# Patient Record
Sex: FEMALE | Race: WHITE | NOT HISPANIC OR LATINO | URBAN - METROPOLITAN AREA
[De-identification: names, ages, dates, MRNs, and addresses within clinical notes are randomized per-mention and may not be internally consistent; named-entity substitution may affect disease eponyms.]

---

## 2018-10-11 ENCOUNTER — INPATIENT (INPATIENT)
Facility: HOSPITAL | Age: 28
LOS: 5 days | Discharge: HOME | DRG: 880 | End: 2018-10-17
Attending: PSYCHIATRY & NEUROLOGY | Admitting: PSYCHIATRY & NEUROLOGY
Payer: COMMERCIAL

## 2018-10-11 VITALS
WEIGHT: 139.99 LBS | SYSTOLIC BLOOD PRESSURE: 146 MMHG | HEART RATE: 97 BPM | DIASTOLIC BLOOD PRESSURE: 72 MMHG | RESPIRATION RATE: 18 BRPM | TEMPERATURE: 98 F | OXYGEN SATURATION: 95 %

## 2018-10-11 DIAGNOSIS — F33.2 MAJOR DEPRESSIVE DISORDER, RECURRENT SEVERE WITHOUT PSYCHOTIC FEATURES: ICD-10-CM

## 2018-10-11 LAB
ANION GAP SERPL CALC-SCNC: 14 MMOL/L — SIGNIFICANT CHANGE UP (ref 5–17)
APAP SERPL-MCNC: <5 UG/ML — LOW (ref 10–30)
APPEARANCE UR: ABNORMAL
BASOPHILS NFR BLD AUTO: 0.4 % — SIGNIFICANT CHANGE UP (ref 0–2)
BILIRUB UR-MCNC: NEGATIVE — SIGNIFICANT CHANGE UP
BUN SERPL-MCNC: 12 MG/DL — SIGNIFICANT CHANGE UP (ref 7–23)
CALCIUM SERPL-MCNC: 9.9 MG/DL — SIGNIFICANT CHANGE UP (ref 8.4–10.5)
CHLORIDE SERPL-SCNC: 100 MMOL/L — SIGNIFICANT CHANGE UP (ref 96–108)
CO2 SERPL-SCNC: 27 MMOL/L — SIGNIFICANT CHANGE UP (ref 22–31)
COLOR SPEC: YELLOW — SIGNIFICANT CHANGE UP
CREAT SERPL-MCNC: 0.8 MG/DL — SIGNIFICANT CHANGE UP (ref 0.5–1.3)
DIFF PNL FLD: NEGATIVE — SIGNIFICANT CHANGE UP
EOSINOPHIL NFR BLD AUTO: 0.2 % — SIGNIFICANT CHANGE UP (ref 0–6)
ETHANOL SERPL-MCNC: <10 MG/DL — SIGNIFICANT CHANGE UP (ref 0–10)
GLUCOSE SERPL-MCNC: 111 MG/DL — HIGH (ref 70–99)
GLUCOSE UR QL: NEGATIVE — SIGNIFICANT CHANGE UP
HCG SERPL-ACNC: <.1 MIU/ML — SIGNIFICANT CHANGE UP
HCT VFR BLD CALC: 38.6 % — SIGNIFICANT CHANGE UP (ref 34.5–45)
HGB BLD-MCNC: 12.8 G/DL — SIGNIFICANT CHANGE UP (ref 11.5–15.5)
KETONES UR-MCNC: ABNORMAL MG/DL
LEUKOCYTE ESTERASE UR-ACNC: NEGATIVE — SIGNIFICANT CHANGE UP
LYMPHOCYTES # BLD AUTO: 24.8 % — SIGNIFICANT CHANGE UP (ref 13–44)
MCHC RBC-ENTMCNC: 29.1 PG — SIGNIFICANT CHANGE UP (ref 27–34)
MCHC RBC-ENTMCNC: 33.2 G/DL — SIGNIFICANT CHANGE UP (ref 32–36)
MCV RBC AUTO: 87.7 FL — SIGNIFICANT CHANGE UP (ref 80–100)
MONOCYTES NFR BLD AUTO: 5.1 % — SIGNIFICANT CHANGE UP (ref 2–14)
NEUTROPHILS NFR BLD AUTO: 69.5 % — SIGNIFICANT CHANGE UP (ref 43–77)
NITRITE UR-MCNC: NEGATIVE — SIGNIFICANT CHANGE UP
PCP SPEC-MCNC: SIGNIFICANT CHANGE UP
PH UR: 6 — SIGNIFICANT CHANGE UP (ref 5–8)
PLATELET # BLD AUTO: 289 K/UL — SIGNIFICANT CHANGE UP (ref 150–400)
POTASSIUM SERPL-MCNC: 3.8 MMOL/L — SIGNIFICANT CHANGE UP (ref 3.5–5.3)
POTASSIUM SERPL-SCNC: 3.8 MMOL/L — SIGNIFICANT CHANGE UP (ref 3.5–5.3)
PROT UR-MCNC: NEGATIVE MG/DL — SIGNIFICANT CHANGE UP
RBC # BLD: 4.4 M/UL — SIGNIFICANT CHANGE UP (ref 3.8–5.2)
RBC # FLD: 13 % — SIGNIFICANT CHANGE UP (ref 10.3–16.9)
SALICYLATES SERPL-MCNC: <0.3 MG/DL — LOW (ref 2.8–20)
SODIUM SERPL-SCNC: 141 MMOL/L — SIGNIFICANT CHANGE UP (ref 135–145)
SP GR SPEC: 1.02 — SIGNIFICANT CHANGE UP (ref 1–1.03)
UROBILINOGEN FLD QL: 0.2 E.U./DL — SIGNIFICANT CHANGE UP
WBC # BLD: 9.4 K/UL — SIGNIFICANT CHANGE UP (ref 3.8–10.5)
WBC # FLD AUTO: 9.4 K/UL — SIGNIFICANT CHANGE UP (ref 3.8–10.5)

## 2018-10-11 PROCEDURE — 99285 EMERGENCY DEPT VISIT HI MDM: CPT | Mod: 25

## 2018-10-11 PROCEDURE — 93010 ELECTROCARDIOGRAM REPORT: CPT | Mod: NC

## 2018-10-11 RX ORDER — TRAZODONE HCL 50 MG
50 TABLET ORAL AT BEDTIME
Qty: 0 | Refills: 0 | Status: DISCONTINUED | OUTPATIENT
Start: 2018-10-11 | End: 2018-10-17

## 2018-10-11 RX ORDER — ACETAMINOPHEN 500 MG
650 TABLET ORAL EVERY 6 HOURS
Qty: 0 | Refills: 0 | Status: DISCONTINUED | OUTPATIENT
Start: 2018-10-11 | End: 2018-10-17

## 2018-10-11 RX ORDER — MAGNESIUM HYDROXIDE 400 MG/1
30 TABLET, CHEWABLE ORAL DAILY
Qty: 0 | Refills: 0 | Status: DISCONTINUED | OUTPATIENT
Start: 2018-10-11 | End: 2018-10-17

## 2018-10-11 RX ADMIN — Medication 50 MILLIGRAM(S): at 22:35

## 2018-10-11 RX ADMIN — Medication 1 MILLIGRAM(S): at 22:35

## 2018-10-11 NOTE — ED BEHAVIORAL HEALTH ASSESSMENT NOTE - RISK ASSESSMENT
Imminent: Increased by passive SI, past SA, mood sx, etoh use, family hx. Decreased by help seeking, engagement in outpatient services and past response to treatment  Chronic: Increased by above as well as long-standing mood sx

## 2018-10-11 NOTE — ED BEHAVIORAL HEALTH ASSESSMENT NOTE - PSYCHIATRIC ISSUES AND PLAN (INCLUDE STANDING AND PRN MEDICATION)
lorazepam 1 mg po q8h prn anxiety, trazodone prn sleep, primary team to determine standing medications

## 2018-10-11 NOTE — ED BEHAVIORAL HEALTH ASSESSMENT NOTE - CURRENT MEDICATION
Alprazolam (has only taken 7-8 times over the past 2-3 months and halves a 0.5 mg table - take 0.25)  sertraline 50 mg po daily

## 2018-10-11 NOTE — ED BEHAVIORAL HEALTH ASSESSMENT NOTE - OTHER
Therapist Mar Barnes - [phone] two roommates unstable housing (looking for new apartment with roommates and having difficulties), work stressors

## 2018-10-11 NOTE — ED BEHAVIORAL HEALTH ASSESSMENT NOTE - DESCRIPTION (FIRST USE, LAST USE, QUANTITY, FREQUENCY, DURATION)
remote use of cigs reports drinking 3-5 servings of etoh every couple of weeks as rx by pmd (alprazolam)

## 2018-10-11 NOTE — ED BEHAVIORAL HEALTH ASSESSMENT NOTE - SUICIDE RISK FACTORS
Family history of suicide/Agitation/severe anxiety/Substance abuse/dependence/Highly impulsive behavior/Access to means (pills, firearms, etc.)/Mood episode/Anhedonia/Hopelessness

## 2018-10-11 NOTE — ED PROVIDER NOTE - MEDICAL DECISION MAKING DETAILS
Patient in ED with concern for depressed mood and concern for mood instability.  Evaluated by psychiatry team and will plan for voluntary admission to psychiatry team.  Patient aware of plan and medically cleared for psychiatric admission.
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
## 2018-10-11 NOTE — ED ADULT NURSE NOTE - NSIMPLEMENTINTERV_GEN_ALL_ED
Implemented All Universal Safety Interventions:  [place] to call system. Call bell, personal items and telephone within reach. Instruct patient to call for assistance. Room bathroom lighting operational. Non-slip footwear when patient is off stretcher. Physically safe environment: no spills, clutter or unnecessary equipment. Stretcher in lowest position, wheels locked, appropriate side rails in place.

## 2018-10-11 NOTE — ED BEHAVIORAL HEALTH ASSESSMENT NOTE - DESCRIPTION
Medically stable. Labs unremarkable. 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 College educated. B/R in [employer] [facility]. Single. Agnostic.. Has a dog. Domiciled with two female roommates.

## 2018-10-11 NOTE — ED ADULT TRIAGE NOTE - CHIEF COMPLAINT QUOTE
pt c/o depression and anxiety, feeling like " she doesn't want to live any more". denies any visual or auditory hallucinations.  prior SI attempts.

## 2018-10-11 NOTE — ED PROVIDER NOTE - OBJECTIVE STATEMENT
28 year old female with history of depression recently started on Zoloft presents to ED with concern for worsening depression and feelings of hopelessness.  Patient states she believes symptoms have worsened since beginning therapy with Zoloft.  She denies suicidal ideation at this time, however states she has had suicide attempts in the past.  She notes feeling impulsivity and is concerned she is not safe at home.  She is in ED today at the request of her therapist for formal psychiatric evaluation.  She denies any additional acute medical complaints or concerns at this time.
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
## 2018-10-12 PROCEDURE — 99233 SBSQ HOSP IP/OBS HIGH 50: CPT

## 2018-10-12 RX ORDER — LURASIDONE HYDROCHLORIDE 40 MG/1
20 TABLET ORAL ONCE
Qty: 0 | Refills: 0 | Status: COMPLETED | OUTPATIENT
Start: 2018-10-12 | End: 2018-10-12

## 2018-10-12 RX ADMIN — LURASIDONE HYDROCHLORIDE 20 MILLIGRAM(S): 40 TABLET ORAL at 16:19

## 2018-10-12 RX ADMIN — Medication 1 MILLIGRAM(S): at 17:38
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
## 2018-10-12 NOTE — BEHAVIORAL HEALTH ASSESSMENT NOTE - HPI (INCLUDE ILLNESS QUALITY, SEVERITY, DURATION, TIMING, CONTEXT, MODIFYING FACTORS, ASSOCIATED SIGNS AND SYMPTOMS)
29 yo F, domiciled with roommate, single, employed, with no significant past medical history and past psychiatric history of depression and anxiety, one hospitalization at 15 yo for depression, SA x 2 by OD as a teenager, remote h/o SIB by milton, no h/o violence, engaged in weekly therapy, med management by PMD, who presents to the [facility] ED at her own and therapist's referral for SI in the setting of worsening mood symptoms.     On interview, patient is calm, cooperative, and well-related with good EC. She reports she has presented for evaluation due to SI without plan that has become more persistent in the setting of worsening trajectory of long-standing sx of depression and anxiety over the past two to three months. She reports she first developed mood sx in early adolescence. During this time she had one psychiatric admission for depression and two SA by OD. One was discovered by her parents when she began vomiting and she received medical treatment. The other went unreported and there were no medical complications to either. She also engaged in SIB by milton at this time. She believes she had trials of escitalopram, citalopram and buspirone at this time, though is unsure if they were effective because of poor adherence. Denies any memorable side effects. She reports her symptoms improved and by early adulthood she was able to come of off psychotropics. She has intermittently engaged in psychotherapy, recently seeing Therapist Mar Barnes - [phone] for weekly therapy x 5 months. She reports moving to [place] two years ago for work, and she described how living in [place] has promoted some feelings of loneliness and financial stress, though has made friends. She also feels isolated from her family in [place], though is in frequent contact with them. She reports for the past 6 mo feeling worsening sadness, prompting her to re-engage in psychotherapy and start sertraline as rx by her PMD. She reports she feels sertraline has worsened her depression and denies other SEs. More recently, she endorses trouble initiating sleeping, anhedonia (does not even enjoy interactions with her dog), guilty thoughts, low energy, troubles concentrating, low appetite, and PMR. She also reports frequent thoughts of wanting to be dead without plan. While she has been able to cope with these thoughts, she does report at times feeling highly impulsive and has had personal difficulties recently making decisions without thinking. She reports her insight into this behavior has made her feel fearful for her safety as she knows she finds it more and more challenge to cope through suicidal thoughts. She reports daily anxiety without panic sx. She denies AH/VH or IOR. She denies past or current sx of jony. She denies obvious PTSD sx but does report through therapy increasing cognizance of possible past trauma in adolescence she may be avoiding. She reports some recent episodes of depersonalization, and denies derealization or dissociative episodes. She reports intermittent alcohol use that is often binge in nature without sx of withdrawal. She reports remote nicotine use. She denies other substance abuse. She request voluntary admission because she feels unsafe.       Spoke with Therapist Mar Barnes - [phone]. She reports she helped encourage patient to seek inpatient treatment given worsening sx trajectory and more frequent reports of intensifying thoughts about being dead. Of note, both patient and Dr. Barnes describe how patient "wears a mask," often not openly expressing her emotional state through facial expression and body language. Dr. Barnes reports her concern that patient will sometimes appear more euthymic than her internal state and minimize her symptoms. She request primary team contact her at any time for collateral and discharge planning.
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
## 2018-10-13 RX ORDER — LURASIDONE HYDROCHLORIDE 40 MG/1
20 TABLET ORAL DAILY
Qty: 0 | Refills: 0 | Status: DISCONTINUED | OUTPATIENT
Start: 2018-10-13 | End: 2018-10-17

## 2018-10-13 RX ADMIN — Medication 650 MILLIGRAM(S): at 23:10

## 2018-10-13 RX ADMIN — Medication 650 MILLIGRAM(S): at 23:55

## 2018-10-13 RX ADMIN — Medication 1 MILLIGRAM(S): at 21:49

## 2018-10-13 RX ADMIN — LURASIDONE HYDROCHLORIDE 20 MILLIGRAM(S): 40 TABLET ORAL at 10:39

## 2018-10-13 RX ADMIN — Medication 50 MILLIGRAM(S): at 23:10

## 2018-10-14 DIAGNOSIS — F41.1 GENERALIZED ANXIETY DISORDER: ICD-10-CM

## 2018-10-14 DIAGNOSIS — F60.9 PERSONALITY DISORDER, UNSPECIFIED: ICD-10-CM

## 2018-10-14 PROCEDURE — 99231 SBSQ HOSP IP/OBS SF/LOW 25: CPT

## 2018-10-14 RX ADMIN — LURASIDONE HYDROCHLORIDE 20 MILLIGRAM(S): 40 TABLET ORAL at 09:33

## 2018-10-14 RX ADMIN — Medication 50 MILLIGRAM(S): at 21:47

## 2018-10-14 RX ADMIN — Medication 650 MILLIGRAM(S): at 10:34

## 2018-10-14 RX ADMIN — Medication 650 MILLIGRAM(S): at 21:48

## 2018-10-14 RX ADMIN — Medication 650 MILLIGRAM(S): at 09:33
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
## 2018-10-15 PROCEDURE — 99232 SBSQ HOSP IP/OBS MODERATE 35: CPT

## 2018-10-15 RX ADMIN — LURASIDONE HYDROCHLORIDE 20 MILLIGRAM(S): 40 TABLET ORAL at 10:25

## 2018-10-15 RX ADMIN — Medication 650 MILLIGRAM(S): at 22:30

## 2018-10-15 RX ADMIN — Medication 1 MILLIGRAM(S): at 00:02

## 2018-10-15 RX ADMIN — Medication 50 MILLIGRAM(S): at 21:38

## 2018-10-15 RX ADMIN — Medication 1 MILLIGRAM(S): at 23:30

## 2018-10-15 RX ADMIN — Medication 650 MILLIGRAM(S): at 21:38

## 2018-10-15 NOTE — PROGRESS NOTE BEHAVIORAL HEALTH - NSBHFUPINTERVALHXFT_PSY_A_CORE
MSE: comes to groups.  sat up in bed  for interview; fair eye contact; clean; alert, oriented, cognition intact.  denies AVH or s/h i/i/p; speech normal volume, spontaneous, clear.   no tremor or rigidity; constricted affect; tc/tp no peculiarities of language use.  i&j: fair to poor; accepts treatment; however not reflective on sxs or situation.
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
## 2018-10-16 DIAGNOSIS — F31.4 BIPOLAR DISORDER, CURRENT EPISODE DEPRESSED, SEVERE, WITHOUT PSYCHOTIC FEATURES: ICD-10-CM

## 2018-10-16 LAB
CHOLEST SERPL-MCNC: 194 MG/DL — SIGNIFICANT CHANGE UP (ref 10–199)
HBA1C BLD-MCNC: 5.1 % — SIGNIFICANT CHANGE UP (ref 4–5.6)
HDLC SERPL-MCNC: 107 MG/DL — SIGNIFICANT CHANGE UP
LIPID PNL WITH DIRECT LDL SERPL: 65 MG/DL — SIGNIFICANT CHANGE UP
TOTAL CHOLESTEROL/HDL RATIO MEASUREMENT: 1.8 RATIO — LOW (ref 3.3–7.1)
TRIGL SERPL-MCNC: 112 MG/DL — SIGNIFICANT CHANGE UP (ref 10–149)

## 2018-10-16 PROCEDURE — 99232 SBSQ HOSP IP/OBS MODERATE 35: CPT

## 2018-10-16 RX ORDER — LURASIDONE HYDROCHLORIDE 40 MG/1
1 TABLET ORAL
Qty: 15 | Refills: 0 | OUTPATIENT
Start: 2018-10-16 | End: 2018-10-30

## 2018-10-16 RX ORDER — TRAZODONE HCL 50 MG
1 TABLET ORAL
Qty: 7 | Refills: 0 | OUTPATIENT
Start: 2018-10-16 | End: 2018-10-22

## 2018-10-16 RX ADMIN — Medication 1 MILLIGRAM(S): at 12:27

## 2018-10-16 RX ADMIN — Medication 650 MILLIGRAM(S): at 22:20

## 2018-10-16 RX ADMIN — Medication 650 MILLIGRAM(S): at 09:32

## 2018-10-16 RX ADMIN — Medication 650 MILLIGRAM(S): at 10:23

## 2018-10-16 RX ADMIN — Medication 50 MILLIGRAM(S): at 21:27

## 2018-10-16 RX ADMIN — Medication 650 MILLIGRAM(S): at 21:27

## 2018-10-16 RX ADMIN — LURASIDONE HYDROCHLORIDE 20 MILLIGRAM(S): 40 TABLET ORAL at 09:33
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
## 2018-10-16 NOTE — PROGRESS NOTE BEHAVIORAL HEALTH - NSBHFUPINTERVALHXFT_PSY_A_CORE
Approriate behavior on unit; more visiible and cheerful;  MSE: mostly attends  groups.  sat up in bed  for interview; fair eye contact; clean; alert, oriented, cognition intact.  denies AVH or s/h i/i/p; speech normal volume, spontaneous, clear.   no tremor or rigidity; constricted affect; tc/tp no peculiarities of language use.  i&j: fair to poor; accepts treatment; however not reflective on sxs or situation.  More cheerful smiles more often. Appropriate behavior on unit; more visibile and cheerful;  MSE: mostly attends  groups.  sat up in bed  for interview; fair eye contact; clean; alert, oriented, cognition intact.  denies AVH or s/h i/i/p; speech normal volume, spontaneous, clear.   no tremor or rigidity; constricted affect; tc/tp no peculiarities of language use.  i&j: fair to poor; accepts treatment; however not reflective on sxs or situation.  More cheerful smiles more often.

## 2018-10-17 VITALS
RESPIRATION RATE: 20 BRPM | HEART RATE: 94 BPM | DIASTOLIC BLOOD PRESSURE: 65 MMHG | TEMPERATURE: 98 F | SYSTOLIC BLOOD PRESSURE: 88 MMHG

## 2018-10-17 LAB — TSH SERPL-MCNC: 1.25 UIU/ML — SIGNIFICANT CHANGE UP (ref 0.35–4.94)

## 2018-10-17 PROCEDURE — 99238 HOSP IP/OBS DSCHRG MGMT 30/<: CPT

## 2018-10-17 PROCEDURE — 80048 BASIC METABOLIC PNL TOTAL CA: CPT

## 2018-10-17 PROCEDURE — 85025 COMPLETE CBC W/AUTO DIFF WBC: CPT

## 2018-10-17 PROCEDURE — 80061 LIPID PANEL: CPT

## 2018-10-17 PROCEDURE — 84443 ASSAY THYROID STIM HORMONE: CPT

## 2018-10-17 PROCEDURE — 80307 DRUG TEST PRSMV CHEM ANLYZR: CPT

## 2018-10-17 PROCEDURE — 84702 CHORIONIC GONADOTROPIN TEST: CPT

## 2018-10-17 PROCEDURE — 93005 ELECTROCARDIOGRAM TRACING: CPT

## 2018-10-17 PROCEDURE — 83036 HEMOGLOBIN GLYCOSYLATED A1C: CPT

## 2018-10-17 PROCEDURE — 99285 EMERGENCY DEPT VISIT HI MDM: CPT | Mod: 25

## 2018-10-17 PROCEDURE — 36415 COLL VENOUS BLD VENIPUNCTURE: CPT

## 2018-10-17 RX ADMIN — Medication 1 MILLIGRAM(S): at 02:02

## 2018-10-17 RX ADMIN — Medication 650 MILLIGRAM(S): at 10:05

## 2018-10-17 RX ADMIN — LURASIDONE HYDROCHLORIDE 20 MILLIGRAM(S): 40 TABLET ORAL at 10:05

## 2018-10-17 NOTE — PROGRESS NOTE BEHAVIORAL HEALTH - RISK ASSESSMENT
FHx suicide
FHx suicide
Static: family history; mood disorder  Modifiable: mood issues; coping strategies  Protective: future oriented; no wish to harm self.
Static: family history; mood disorder  Modifiable: mood issues; coping strategies  Protective: future oriented; no wish to harm self.
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
## 2018-10-17 NOTE — PROGRESS NOTE BEHAVIORAL HEALTH - PRIMARY DX
Generalized anxiety disorder

## 2018-10-17 NOTE — PROGRESS NOTE BEHAVIORAL HEALTH - PROBLEM SELECTOR PLAN 1
Latuda; see interval and summary data for updates.
Latuda; see interval and summary data for updates.

## 2018-10-17 NOTE — PROGRESS NOTE BEHAVIORAL HEALTH - NSBHFUPINTERVALHXFT_PSY_A_CORE
Approriate behavior on unit; more visiible and cheerful;  MSE:Initially in the TV room then sitting in the day room with others;  ; better eye contact; clean; alert, oriented, cognition intact.  denies AVH or s/h i/i/p; speech normal volume, spontaneous, clear.   no tremor or rigidity; constricted affect; tc/tp no peculiarities of language use.  i&j: fair to poor; accepts treatment; however not reflective on sxs or situation.   Mood continues to improve.  Looks forward to leaving . Appropriate behavior on unit; more visibile and cheerful;  MSE :Initially in the TV room then sitting in the day room with others;  ; better eye contact; clean; alert, oriented, cognition intact.  denies AVH or s/h i/i/p; speech normal volume, spontaneous, clear.   no tremor or rigidity; constricted affect; tc/tp no peculiarities of language use.  i&j: fair to poor; accepts treatment; however not reflective on sxs or situation.   Mood continues to improve.  Looks forward to leaving .

## 2018-10-17 NOTE — PROGRESS NOTE BEHAVIORAL HEALTH - NSBHCHARTREVIEWVS_PSY_A_CORE FT
94/64
Vital Signs Last 24 Hrs  T(C): 36.6 (17 Oct 2018 09:17), Max: 36.6 (16 Oct 2018 17:25)  T(F): 97.9 (17 Oct 2018 09:17), Max: 97.9 (16 Oct 2018 17:25)  HR: 94 (17 Oct 2018 09:17) (87 - 94)  BP: 88/65 (17 Oct 2018 09:17) (88/65 - 114/78)  BP(mean): --  RR: 20 (17 Oct 2018 09:17) (18 - 20)  SpO2: --
Vital Signs Last 24 Hrs  T(C): 36.7 (15 Oct 2018 09:07), Max: 36.8 (14 Oct 2018 16:19)  T(F): 98 (15 Oct 2018 09:07), Max: 98.3 (14 Oct 2018 16:19)  HR: 109 (15 Oct 2018 09:07) (78 - 109)  BP: 91/61 (15 Oct 2018 09:07) (91/61 - 95/61)  BP(mean): --  RR: 20 (15 Oct 2018 09:07) (18 - 20)  SpO2: --
Vital Signs Last 24 Hrs  T(C): 36.8 (15 Oct 2018 16:11), Max: 36.8 (15 Oct 2018 16:11)  T(F): 98.2 (15 Oct 2018 16:11), Max: 98.2 (15 Oct 2018 16:11)  HR: 81 (15 Oct 2018 16:11) (81 - 109)  BP: 105/73 (15 Oct 2018 16:11) (91/61 - 105/73)  BP(mean): --  RR: 18 (15 Oct 2018 16:11) (18 - 20)  SpO2: --
18-Nov-2017 01:40
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
## 2018-10-17 NOTE — PROGRESS NOTE BEHAVIORAL HEALTH - SECONDARY DX1
Personality disorder, unspecified

## 2018-10-21 DIAGNOSIS — F32.9 MAJOR DEPRESSIVE DISORDER, SINGLE EPISODE, UNSPECIFIED: ICD-10-CM

## 2018-10-21 DIAGNOSIS — F41.1 GENERALIZED ANXIETY DISORDER: ICD-10-CM

## 2018-10-21 DIAGNOSIS — F60.9 PERSONALITY DISORDER, UNSPECIFIED: ICD-10-CM

## 2018-10-21 DIAGNOSIS — F31.9 BIPOLAR DISORDER, UNSPECIFIED: ICD-10-CM

## 2018-11-08 NOTE — BEHAVIORAL HEALTH ASSESSMENT NOTE - EMPLOYMENT
Problem: SLP ADULT - SWALLOWING, IMPAIRED  Goal: Advance to least restrictive diet without signs or symptoms of aspiration for planned discharge setting  See evaluation for individualized goals    Outcome: Completed Date Met: 11/08/18 Employed

## 2019-02-12 NOTE — PROGRESS NOTE BEHAVIORAL HEALTH - NS ED [facility] MED ROS NEUROLOGICAL
No complaints
radiology results/need for outpatient follow-up/return to ED if symptoms worsen, persist or questions arise/lab results
No complaints

## 2020-10-30 NOTE — ED BEHAVIORAL HEALTH ASSESSMENT NOTE - HPI (INCLUDE ILLNESS QUALITY, SEVERITY, DURATION, TIMING, CONTEXT, MODIFYING FACTORS, ASSOCIATED SIGNS AND SYMPTOMS)
ICU called me back and said they can not bring patient down b/c he is now a tele patient with no bed yet - per Richar Charge Nurse   29 yo F, domiciled with roommate, single, employed, with no significant past medical history and past psychiatric history of depression and anxiety, one hospitalization at 15 yo for depression, SA x 2 by OD as a teenager, remote h/o SIB by cutting, no h/o violence, engaged in weekly therapy, med management by PMD, who presents to the [facility] ED at her own and therapist's referral for SI in the setting of worsening mood symptoms.     On interview, patient is calm, cooperative, and well-related with good EC. She reports she has presented for evaluation due to SI without plan that has become more persistent in the setting of worsening trajectory of long-standing sx of depression and anxiety over the past two to three months. She reports she first developed mood sx in early adolescence. During this time she had one psychiatric admission for depression and two SA by OD. One was discovered by her parents when she began vomiting and she received medical treatment. The other went unreported and there were no medical complications to either. She also engaged in SIB by cutting at this time.       Of note, both patient and Dr. Barnes describe how patient "wears a mask," often not openly expressing her emotional state through facial expression and body language. Dr. Barnes reports her concern that patient will sometimes appear more euthymic than her internal state and minimize her symptoms. 27 yo F, domiciled with roommate, single, employed, with no significant past medical history and past psychiatric history of depression and anxiety, one hospitalization at 15 yo for depression, SA x 2 by OD as a teenager, remote h/o SIB by milton, no h/o violence, engaged in weekly therapy, med management by PMD, who presents to the [facility] ED at her own and therapist's referral for SI in the setting of worsening mood symptoms.     On interview, patient is calm, cooperative, and well-related with good EC. She reports she has presented for evaluation due to SI without plan that has become more persistent in the setting of worsening trajectory of long-standing sx of depression and anxiety over the past two to three months. She reports she first developed mood sx in early adolescence. During this time she had one psychiatric admission for depression and two SA by OD. One was discovered by her parents when she began vomiting and she received medical treatment. The other went unreported and there were no medical complications to either. She also engaged in SIB by milton at this time. She believes she had trials of escitalopram, citalopram and buspirone at this time, though is unsure if they were effective because of poor adherence. Denies any memorable side effects. She reports her symptoms improved and by early adulthood she was able to come of off psychotropics. She has intermittently engaged in psychotherapy, recently seeing Therapist Mar Barnes - [phone] for weekly therapy x 5 months. She reports moving to [place] two years ago for work, and she described how living in [place] has promoted some feelings of loneliness and financial stress, though has made friends. She also feels isolated from her family in [place], though is in frequent contact with them. She reports for the past 6 mo feeling worsening sadness, prompting her to re-engage in psychotherapy and start sertraline as rx by her PMD. She reports she feels sertraline has worsened her depression and denies other SEs. More recently, she endorses trouble initiating sleeping, anhedonia (does not even enjoy interactions with her dog), guilty thoughts, low energy, troubles concentrating, low appetite, and PMR. She also reports frequent thoughts of wanting to be dead without plan. While she has been able to cope with these thoughts, she does report at times feeling highly impulsive and has had personal difficulties recently making decisions without thinking. She reports her insight into this behavior has made her feel fearful for her safety as she knows she finds it more and more challenge to cope through suicidal thoughts. She reports daily anxiety without panic sx. She denies AH/VH or IOR. She denies past or current sx of jony. She denies obvious PTSD sx but does report through therapy increasing cognizance of possible past trauma in adolescence she may be avoiding. She reports some recent episodes of depersonalization, and denies derealization or dissociative episodes. She reports intermittent alcohol use that is often binge in nature without sx of withdrawal. She reports remote nicotine use. She denies other substance abuse. She request voluntary admission because she feels unsafe.       Spoke with Therapist Mar Barnes - [phone]. She reports she helped encourage patient to seek inpatient treatment given worsening sx trajectory and more frequent reports of intensifying thoughts about being dead. Of note, both patient and Dr. Barnes describe how patient "wears a mask," often not openly expressing her emotional state through facial expression and body language. Dr. Barnes reports her concern that patient will sometimes appear more euthymic than her internal state and minimize her symptoms. She request primary team contact her at any time for collateral and discharge planning.
